# Patient Record
Sex: MALE | URBAN - METROPOLITAN AREA
[De-identification: names, ages, dates, MRNs, and addresses within clinical notes are randomized per-mention and may not be internally consistent; named-entity substitution may affect disease eponyms.]

---

## 2020-01-20 ENCOUNTER — APPOINTMENT (RX ONLY)
Dept: URBAN - METROPOLITAN AREA CLINIC 4 | Facility: CLINIC | Age: 20
Setting detail: DERMATOLOGY
End: 2020-01-20

## 2020-01-20 PROBLEM — D23.4 OTHER BENIGN NEOPLASM OF SKIN OF SCALP AND NECK: Status: ACTIVE | Noted: 2020-01-20

## 2020-01-20 PROBLEM — J45.909 UNSPECIFIED ASTHMA, UNCOMPLICATED: Status: ACTIVE | Noted: 2020-01-20

## 2020-01-20 PROCEDURE — 99202 OFFICE O/P NEW SF 15 MIN: CPT

## 2020-01-20 PROCEDURE — ? COUNSELING

## 2020-01-20 PROCEDURE — ? OTHER

## 2020-01-20 NOTE — HPI: CYST
How Severe Is Your Cyst?: mild
Is This A New Presentation, Or A Follow-Up?: Cyst
Additional History: Patient reports he has a recurrent cyst like mass on right neck that first started in August. Patient reports cyst drained and went away. Recurred at [place] drained again and now has started to come back.

## 2020-01-20 NOTE — PROCEDURE: OTHER
Other (Free Text): Discussed removal of cyst or injection if recurs or worsens. \\nDefers Rx for retinal today.\\n\\nReviewed photos on patients phone from august 2019.
Note Text (......Xxx Chief Complaint.): This diagnosis correlates with the
Detail Level: Zone